# Patient Record
Sex: FEMALE | URBAN - METROPOLITAN AREA
[De-identification: names, ages, dates, MRNs, and addresses within clinical notes are randomized per-mention and may not be internally consistent; named-entity substitution may affect disease eponyms.]

---

## 2024-03-11 ENCOUNTER — LAB (OUTPATIENT)
Dept: URBAN - METROPOLITAN AREA CLINIC 72 | Facility: CLINIC | Age: 49
End: 2024-03-11

## 2024-03-11 ENCOUNTER — OV NP (OUTPATIENT)
Dept: URBAN - METROPOLITAN AREA CLINIC 72 | Facility: CLINIC | Age: 49
End: 2024-03-11
Payer: COMMERCIAL

## 2024-03-11 VITALS
TEMPERATURE: 97.5 F | BODY MASS INDEX: 39.15 KG/M2 | SYSTOLIC BLOOD PRESSURE: 127 MMHG | HEIGHT: 66 IN | HEART RATE: 91 BPM | WEIGHT: 243.6 LBS | DIASTOLIC BLOOD PRESSURE: 74 MMHG

## 2024-03-11 DIAGNOSIS — K59.04 CHRONIC IDIOPATHIC CONSTIPATION: ICD-10-CM

## 2024-03-11 DIAGNOSIS — K64.0 BLEEDING GRADE I HEMORRHOIDS: ICD-10-CM

## 2024-03-11 DIAGNOSIS — Z80.0 FAMILY HISTORY OF COLON CANCER IN FATHER: ICD-10-CM

## 2024-03-11 DIAGNOSIS — K62.5 BRIGHT RED BLOOD PER RECTUM: ICD-10-CM

## 2024-03-11 PROBLEM — 82934008: Status: ACTIVE | Noted: 2024-03-11

## 2024-03-11 PROCEDURE — 99214 OFFICE O/P EST MOD 30 MIN: CPT | Performed by: INTERNAL MEDICINE

## 2024-03-11 PROCEDURE — 46600 DIAGNOSTIC ANOSCOPY SPX: CPT | Performed by: INTERNAL MEDICINE

## 2024-03-11 RX ORDER — ACETAMINOPHEN AND CODEINE PHOSPHATE 300; 30 MG/1; MG/1
TABLET ORAL
Qty: 20 TABLET | Status: ON HOLD | COMMUNITY

## 2024-03-11 RX ORDER — FLUOXETINE HYDROCHLORIDE 20 MG/1
CAPSULE ORAL
Qty: 90 CAPSULE | Status: ACTIVE | COMMUNITY

## 2024-03-11 RX ORDER — PREDNISONE 20 MG/1
TABLET ORAL
Qty: 10 TABLET | Status: ON HOLD | COMMUNITY

## 2024-03-11 RX ORDER — DIAZEPAM 10 MG/1
TABLET ORAL
Qty: 4 TABLET | Status: ON HOLD | COMMUNITY

## 2024-03-11 RX ORDER — CYCLOBENZAPRINE HYDROCHLORIDE 10 MG/1
TABLET, FILM COATED ORAL
Qty: 40 TABLET | Status: ACTIVE | COMMUNITY

## 2024-03-11 RX ORDER — AMOXICILLIN AND CLAVULANATE POTASSIUM 875; 125 MG/1; MG/1
TABLET, FILM COATED ORAL
Qty: 14 TABLET | Status: ON HOLD | COMMUNITY

## 2024-03-11 RX ORDER — METRONIDAZOLE 500 MG/1
TABLET ORAL
Qty: 20 TABLET | Status: ON HOLD | COMMUNITY

## 2024-03-11 RX ORDER — METOPROLOL TARTRATE 25 MG/1
TABLET, FILM COATED ORAL
Qty: 120 TABLET | Status: ACTIVE | COMMUNITY

## 2024-03-11 RX ORDER — ESZOPICLONE 2 MG/1
TABLET, FILM COATED ORAL
Qty: 30 TABLET | Status: ACTIVE | COMMUNITY

## 2024-03-11 RX ORDER — FLUCONAZOLE 200 MG/1
TABLET ORAL
Qty: 1 TABLET | Status: ON HOLD | COMMUNITY

## 2024-03-11 NOTE — HPI-TODAY'S VISIT:
48-year-old female here for rectal bleeding.  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  She has had pancreatitis in the past and family history of pancreatic cancer and colorectal cancer.  MRI with MRCP ordered for further evaluation.  Will arrange for EUS if abnormal.  Eyes recommend yearly MRI.  Past medical history of Acquired tracheomalacia Following [facility]  Pulmonology Dr. Valerio Last Bronchoscopy was 12/2023.Per [facility] notes history of tracheobronchial plasty for severe expiratory central airway collapse who underwent second operation cervical tracheal plasty due to severe collapse at the proximal trachea and cricoid.  Has been on steroid tapers in the past.  Gets bronchoscopies every few months. She has had pancreatitis in the past.  Family history of pancreatic cancer.  Father had colon cancer-was 49.  Had melanoma that spread to his colon.   Dr. Dacosta is her local pulmonologist.    She reports rectal bleeding. Would have some rare BRBPR on the wipe in the past.  In the last 2-3 weeks worse.  Has chronic constipation, will have a BM every 2-3 weeks if she doesn't use laxatives.  Tried miralax as needed, dulcolax chews she takes daily to have a BM. Last BM was last Thursday was hard and soft.  Last saw blood on the wipe and in the bowl when she had a BM last Thursday.   No CP, SOB, palpitations, syncope, near-syncope or problems with anesthesia previously.   Labs a couple of months ago.    No prior colonoscopy.

## 2024-03-11 NOTE — EXAM-PHYSICAL EXAM
Anoscopy: Grade I internal hemorrhoids, no rectal masses, or bleeding present.  GENNY: Normal sphincter tone and rectal muscle upon bearing down. No tenderness on exam.  Chaperone present

## 2024-03-11 NOTE — HPI-OTHER HISTORIES
Imaging: -MRI abdomen 4/14/2021 was negative.  No evidence for abnormal enhancement.  No mass identified. -CT abdomen and pelvis without contrast 11/2/2023 no acute process. Pancreas is normal.